# Patient Record
Sex: MALE | Race: WHITE
[De-identification: names, ages, dates, MRNs, and addresses within clinical notes are randomized per-mention and may not be internally consistent; named-entity substitution may affect disease eponyms.]

---

## 2023-07-19 ENCOUNTER — HOSPITAL ENCOUNTER
Dept: HOSPITAL 101 - LAB | Age: 83
Discharge: HOME | End: 2023-07-19
Payer: MEDICARE

## 2023-07-19 DIAGNOSIS — N18.31: Primary | ICD-10-CM

## 2023-07-19 DIAGNOSIS — E11.65: ICD-10-CM

## 2023-07-19 LAB
ANION GAP: 14.4
BLOOD UREA NITROGEN: 20 MG/DL (ref 7–18)
CALCIUM: 8.5 MG/DL (ref 8.5–10.1)
CARBON DIOXIDE: 26 MMOL/L (ref 21–32)
CHLORIDE: 100 MMOL/L (ref 98–107)
CO2 BLD-SCNC: 26 MMOL/L (ref 21–32)
ESTIMATED GFR (AFRICAN AMERICA: >60 (ref 60–?)
ESTIMATED GFR (NON-AFRICAN AME: 50 (ref 60–?)
GLUCOSE BLD-MCNC: 109 MG/DL (ref 74–106)
MAGNESIUM: 1.7 MG/DL (ref 1.8–2.4)
POTASSIUM SERPLBLD-SCNC: 4.4 MMOL/L (ref 3.5–5.1)
POTASSIUM: 4.4 MMOL/L (ref 3.5–5.1)
SODIUM BLD-SCNC: 136 MMOL/L (ref 136–145)
SODIUM: 136 MMOL/L (ref 136–145)

## 2023-07-19 PROCEDURE — 83735 ASSAY OF MAGNESIUM: CPT

## 2023-07-19 PROCEDURE — 36415 COLL VENOUS BLD VENIPUNCTURE: CPT

## 2023-07-19 PROCEDURE — 80048 BASIC METABOLIC PNL TOTAL CA: CPT

## 2023-07-19 PROCEDURE — 83036 HEMOGLOBIN GLYCOSYLATED A1C: CPT

## 2023-09-07 ENCOUNTER — HOSPITAL ENCOUNTER
Dept: HOSPITAL 101 - LAB | Age: 83
Discharge: HOME | End: 2023-09-07
Payer: MEDICARE

## 2023-09-07 DIAGNOSIS — R53.83: ICD-10-CM

## 2023-09-07 DIAGNOSIS — R55: ICD-10-CM

## 2023-09-07 DIAGNOSIS — E11.65: Primary | ICD-10-CM

## 2023-09-07 DIAGNOSIS — I10: ICD-10-CM

## 2023-09-07 LAB
ADD MANUAL DIFF: NO
ALANINE AMINOTRANSFERASE: 26 U/L (ref 16–63)
ALBUMIN GLOBULIN RATIO: 1.2
ALBUMIN LEVEL: 4.2 G/DL (ref 3.4–5)
ALKALINE PHOSPHATASE: 82 U/L (ref 46–116)
ANION GAP: 10.2
ASPARTATE AMINO TRANSFERASE: 16 U/L (ref 15–37)
BLOOD UREA NITROGEN: 21 MG/DL (ref 7–18)
CALCIUM: 9 MG/DL (ref 8.5–10.1)
CARBON DIOXIDE: 24.6 MMOL/L (ref 21–32)
CHLORIDE: 96 MMOL/L (ref 98–107)
CO2 BLD-SCNC: 24.6 MMOL/L (ref 21–32)
ESTIMATED GFR (AFRICAN AMERICA: 48 (ref 60–?)
ESTIMATED GFR (NON-AFRICAN AME: 39 (ref 60–?)
GLOBULIN: 3.4 G/DL
GLUCOSE BLD-MCNC: 100 MG/DL (ref 74–106)
HCT VFR BLD CALC: 42.9 % (ref 42–54)
HEMATOCRIT: 42.9 % (ref 42–54)
HEMOGLOBIN: 14.2 G/DL (ref 14–18)
IMMATURE GRANULOCYTES ABS AUTO: 0.03 10^3/UL (ref 0–0.03)
IMMATURE GRANULOCYTES PCT AUTO: 0.4 % (ref 0–0.5)
LYMPHOCYTES  ABSOLUTE AUTO: 1.2 10^3/UL (ref 1.2–3.8)
MCV RBC: 89.9 FL (ref 80–94)
MEAN CORPUSCULAR HEMOGLOBIN: 29.8 PG (ref 25.9–34)
MEAN CORPUSCULAR HGB CONC: 33.1 G/DL (ref 29.9–35.2)
MEAN CORPUSCULAR VOLUME: 89.9 FL (ref 80–94)
PLATELET # BLD: 187 10^3/UL (ref 150–450)
PLATELET COUNT: 187 10^3/UL (ref 150–450)
POTASSIUM SERPLBLD-SCNC: 3.8 MMOL/L (ref 3.5–5.1)
POTASSIUM: 3.8 MMOL/L (ref 3.5–5.1)
RED BLOOD COUNT: 4.77 10^6/UL (ref 4.7–6.1)
SODIUM BLD-SCNC: 127 MMOL/L (ref 136–145)
SODIUM: 127 MMOL/L (ref 136–145)
THYROID STIMULATING HORMONE: 3.02 UIU/ML (ref 0.36–3.74)
TOTAL PROTEIN: 7.6 G/DL (ref 6.4–8.2)
WBC # BLD: 7.9 10^3/UL (ref 4–11)
WHITE BLOOD COUNT: 7.9 10^3/UL (ref 4–11)

## 2023-09-07 PROCEDURE — 85025 COMPLETE CBC W/AUTO DIFF WBC: CPT

## 2023-09-07 PROCEDURE — 36415 COLL VENOUS BLD VENIPUNCTURE: CPT

## 2023-09-07 PROCEDURE — 80053 COMPREHEN METABOLIC PANEL: CPT

## 2023-09-07 PROCEDURE — 84443 ASSAY THYROID STIM HORMONE: CPT

## 2023-09-07 PROCEDURE — 83036 HEMOGLOBIN GLYCOSYLATED A1C: CPT

## 2023-09-18 ENCOUNTER — HOSPITAL ENCOUNTER
Dept: HOSPITAL 101 - LAB | Age: 83
Discharge: HOME | End: 2023-09-18
Payer: MEDICARE

## 2023-09-18 ENCOUNTER — HOSPITAL ENCOUNTER
Dept: HOSPITAL 101 - US | Age: 83
Discharge: HOME | End: 2023-09-18
Payer: MEDICARE

## 2023-09-18 DIAGNOSIS — E87.1: Primary | ICD-10-CM

## 2023-09-18 DIAGNOSIS — R22.41: Primary | ICD-10-CM

## 2023-09-18 LAB — THYROID STIMULATING HORMONE: 1.94 UIU/ML (ref 0.36–3.74)

## 2023-09-18 PROCEDURE — 76882 US LMTD JT/FCL EVL NVASC XTR: CPT

## 2023-09-18 PROCEDURE — 36415 COLL VENOUS BLD VENIPUNCTURE: CPT

## 2023-09-18 PROCEDURE — 83935 ASSAY OF URINE OSMOLALITY: CPT

## 2023-09-18 PROCEDURE — 83930 ASSAY OF BLOOD OSMOLALITY: CPT

## 2023-09-18 PROCEDURE — 84443 ASSAY THYROID STIM HORMONE: CPT

## 2023-09-18 PROCEDURE — 84300 ASSAY OF URINE SODIUM: CPT

## 2023-09-19 LAB — OSMOLALITY, URINE: 602 MOSMOL/KG

## 2023-10-12 DIAGNOSIS — I48.0 PAROXYSMAL ATRIAL FIBRILLATION (MULTI): ICD-10-CM

## 2023-10-13 PROBLEM — Z95.1 HISTORY OF CORONARY ARTERY BYPASS GRAFT: Status: ACTIVE | Noted: 2023-10-13

## 2023-10-13 PROBLEM — I10 ESSENTIAL HYPERTENSION: Status: ACTIVE | Noted: 2023-10-13

## 2023-10-13 PROBLEM — Z79.01 LONG TERM CURRENT USE OF ANTICOAGULANT THERAPY: Status: ACTIVE | Noted: 2023-10-13

## 2023-10-13 PROBLEM — E78.5 HYPERLIPIDEMIA: Status: ACTIVE | Noted: 2023-10-13

## 2023-10-13 PROBLEM — I20.9 ANGINA, CLASS I (CMS-HCC): Status: ACTIVE | Noted: 2023-10-13

## 2023-10-13 PROBLEM — I48.0 PAROXYSMAL ATRIAL FIBRILLATION (MULTI): Status: ACTIVE | Noted: 2023-10-13

## 2023-10-13 PROBLEM — G45.9 TIA (TRANSIENT ISCHEMIC ATTACK): Status: ACTIVE | Noted: 2023-10-13

## 2023-10-13 PROBLEM — R55 SYNCOPE AND COLLAPSE: Status: ACTIVE | Noted: 2023-10-13

## 2023-10-13 PROBLEM — D64.9 ANEMIA: Status: ACTIVE | Noted: 2023-10-13

## 2023-10-13 PROBLEM — C7A.8 NEUROENDOCRINE CANCER (MULTI): Status: ACTIVE | Noted: 2023-10-13

## 2023-10-13 PROBLEM — I25.10 ASCVD (ARTERIOSCLEROTIC CARDIOVASCULAR DISEASE): Status: ACTIVE | Noted: 2023-10-13

## 2023-10-13 PROBLEM — Z98.61 HISTORY OF PTCA: Status: ACTIVE | Noted: 2023-10-13

## 2023-10-13 RX ORDER — LOSARTAN POTASSIUM 50 MG/1
50 TABLET ORAL 2 TIMES DAILY
COMMUNITY
Start: 2023-10-06

## 2023-10-13 RX ORDER — ATORVASTATIN CALCIUM 40 MG/1
1 TABLET, FILM COATED ORAL NIGHTLY
COMMUNITY

## 2023-10-13 RX ORDER — HYDROCODONE BITARTRATE AND ACETAMINOPHEN 5; 325 MG/1; MG/1
1 TABLET ORAL EVERY 4 HOURS PRN
COMMUNITY
Start: 2023-07-05

## 2023-10-13 RX ORDER — AMIODARONE HYDROCHLORIDE 200 MG/1
200 TABLET ORAL DAILY
COMMUNITY
Start: 2023-09-19 | End: 2023-12-05 | Stop reason: SDUPTHER

## 2023-10-13 RX ORDER — TAMSULOSIN HYDROCHLORIDE 0.4 MG/1
1 CAPSULE ORAL DAILY
COMMUNITY

## 2023-10-13 RX ORDER — POTASSIUM CHLORIDE 20 MEQ/1
20 TABLET, EXTENDED RELEASE ORAL 2 TIMES DAILY
COMMUNITY
Start: 2023-09-19

## 2023-10-13 RX ORDER — ALPRAZOLAM 0.5 MG/1
0.5 TABLET ORAL EVERY 8 HOURS PRN
COMMUNITY

## 2023-10-13 RX ORDER — LINACLOTIDE 145 UG/1
145 CAPSULE, GELATIN COATED ORAL
COMMUNITY
Start: 2023-03-22

## 2023-10-13 RX ORDER — LANOLIN ALCOHOL/MO/W.PET/CERES
1 CREAM (GRAM) TOPICAL 2 TIMES DAILY
COMMUNITY
Start: 2023-05-31

## 2023-10-13 RX ORDER — APIXABAN 5 MG/1
5 TABLET, FILM COATED ORAL 2 TIMES DAILY
COMMUNITY
Start: 2023-10-12

## 2023-10-13 RX ORDER — MEMANTINE HYDROCHLORIDE 5 MG/1
5 TABLET ORAL DAILY
COMMUNITY
Start: 2023-10-09

## 2023-10-13 RX ORDER — PANTOPRAZOLE SODIUM 40 MG/1
1 TABLET, DELAYED RELEASE ORAL DAILY
COMMUNITY

## 2023-10-16 ENCOUNTER — TELEPHONE (OUTPATIENT)
Dept: CARDIOLOGY | Facility: CLINIC | Age: 83
End: 2023-10-16
Payer: MEDICARE

## 2023-10-16 DIAGNOSIS — I48.0 PAROXYSMAL ATRIAL FIBRILLATION (MULTI): ICD-10-CM

## 2023-10-16 RX ORDER — AMIODARONE HYDROCHLORIDE 200 MG/1
200 TABLET ORAL DAILY
Qty: 90 TABLET | Refills: 1 | Status: SHIPPED | OUTPATIENT
Start: 2023-10-16 | End: 2024-01-04

## 2023-10-16 NOTE — TELEPHONE ENCOUNTER
Daughter, Yadi called regarding amio medication. States patient ran out about 7 days ago and is not sure if he was taking correctly prior to that. States she has marked his medication for one tablet daily and will monitor and follow medication. Inquired if patient should be set up for chest, pft and lab work for amio at ?   Daughter also wanted to update office his b/p remains in the elevated range-179/?  To Dr. Clifford Fish, DO

## 2023-10-19 ENCOUNTER — HOSPITAL ENCOUNTER
Dept: HOSPITAL 101 - LAB | Age: 83
Discharge: HOME | End: 2023-10-19
Payer: MEDICARE

## 2023-10-19 DIAGNOSIS — I10: ICD-10-CM

## 2023-10-19 DIAGNOSIS — E11.65: Primary | ICD-10-CM

## 2023-10-19 DIAGNOSIS — E87.1: ICD-10-CM

## 2023-10-19 LAB
ANION GAP: 8.9
BLOOD UREA NITROGEN: 20 MG/DL (ref 7–18)
CALCIUM: 8.6 MG/DL (ref 8.5–10.1)
CARBON DIOXIDE: 28.6 MMOL/L (ref 21–32)
CHLORIDE: 97 MMOL/L (ref 98–107)
CHOLESTEROL: 156 MG/DL (ref ?–200)
CO2 BLD-SCNC: 28.6 MMOL/L (ref 21–32)
ESTIMATED GFR (AFRICAN AMERICA: >60 (ref 60–?)
ESTIMATED GFR (NON-AFRICAN AME: 51 (ref 60–?)
GLUCOSE BLD-MCNC: 81 MG/DL (ref 74–106)
HDL CHOLESTEROL: 75 MG/DL (ref 40–60)
POTASSIUM SERPLBLD-SCNC: 4.5 MMOL/L (ref 3.5–5.1)
POTASSIUM: 4.5 MMOL/L (ref 3.5–5.1)
SODIUM BLD-SCNC: 130 MMOL/L (ref 136–145)
SODIUM: 130 MMOL/L (ref 136–145)
TOTAL PROTEIN: 6.7 G/DL (ref 6.4–8.2)
TRIGLYCERIDES: 42 MG/DL (ref ?–150)
VLDL CHOLESTEROL: 8.4 MG/DL

## 2023-10-19 PROCEDURE — 80048 BASIC METABOLIC PNL TOTAL CA: CPT

## 2023-10-19 PROCEDURE — 83930 ASSAY OF BLOOD OSMOLALITY: CPT

## 2023-10-19 PROCEDURE — 80061 LIPID PANEL: CPT

## 2023-10-19 PROCEDURE — 36415 COLL VENOUS BLD VENIPUNCTURE: CPT

## 2023-10-19 PROCEDURE — 84155 ASSAY OF PROTEIN SERUM: CPT

## 2023-11-21 ENCOUNTER — HOSPITAL ENCOUNTER
Dept: HOSPITAL 101 - CARD | Age: 83
Discharge: HOME | End: 2023-11-21
Payer: MEDICARE

## 2023-11-21 DIAGNOSIS — I48.0: Primary | ICD-10-CM

## 2023-11-21 LAB
ANION GAP: 14.6
ASPARTATE AMINO TRANSFERASE: 22 U/L (ref 15–37)
BLOOD UREA NITROGEN: 17 MG/DL (ref 7–18)
CALCIUM: 8.5 MG/DL (ref 8.5–10.1)
CARBON DIOXIDE: 25.5 MMOL/L (ref 21–32)
CHLORIDE: 97 MMOL/L (ref 98–107)
CO2 BLD-SCNC: 25.5 MMOL/L (ref 21–32)
ESTIMATED GFR (AFRICAN AMERICA: 58 (ref 60–?)
ESTIMATED GFR (NON-AFRICAN AME: 48 (ref 60–?)
GLUCOSE BLD-MCNC: 81 MG/DL (ref 74–106)
HEMOGLOBIN: 12.8 G/DL (ref 14–18)
POTASSIUM SERPLBLD-SCNC: 4.1 MMOL/L (ref 3.5–5.1)
POTASSIUM: 4.1 MMOL/L (ref 3.5–5.1)
SODIUM BLD-SCNC: 133 MMOL/L (ref 136–145)
SODIUM: 133 MMOL/L (ref 136–145)
THYROID STIMULATING HORMONE: 4.25 UIU/ML (ref 0.36–3.74)

## 2023-11-21 PROCEDURE — 71046 X-RAY EXAM CHEST 2 VIEWS: CPT

## 2023-11-21 PROCEDURE — 94729 DIFFUSING CAPACITY: CPT

## 2023-11-21 PROCEDURE — 84450 TRANSFERASE (AST) (SGOT): CPT

## 2023-11-21 PROCEDURE — 85018 HEMOGLOBIN: CPT

## 2023-11-21 PROCEDURE — 94060 EVALUATION OF WHEEZING: CPT

## 2023-11-21 PROCEDURE — 84443 ASSAY THYROID STIM HORMONE: CPT

## 2023-11-21 PROCEDURE — 80048 BASIC METABOLIC PNL TOTAL CA: CPT

## 2023-11-21 PROCEDURE — 36415 COLL VENOUS BLD VENIPUNCTURE: CPT

## 2023-11-21 PROCEDURE — 94726 PLETHYSMOGRAPHY LUNG VOLUMES: CPT

## 2023-11-21 RX ADMIN — ALBUTEROL SULFATE 2.5 MG: 2.5 SOLUTION RESPIRATORY (INHALATION) at 14:14

## 2023-12-05 ENCOUNTER — OFFICE VISIT (OUTPATIENT)
Dept: CARDIOLOGY | Facility: CLINIC | Age: 83
End: 2023-12-05
Payer: MEDICARE

## 2023-12-05 VITALS
HEART RATE: 64 BPM | BODY MASS INDEX: 28.35 KG/M2 | WEIGHT: 198 LBS | HEIGHT: 70 IN | DIASTOLIC BLOOD PRESSURE: 80 MMHG | SYSTOLIC BLOOD PRESSURE: 152 MMHG

## 2023-12-05 DIAGNOSIS — E78.2 MIXED HYPERLIPIDEMIA: ICD-10-CM

## 2023-12-05 DIAGNOSIS — I48.0 PAROXYSMAL ATRIAL FIBRILLATION (MULTI): Primary | ICD-10-CM

## 2023-12-05 DIAGNOSIS — Z79.899 HIGH RISK MEDICATION USE: ICD-10-CM

## 2023-12-05 DIAGNOSIS — I25.10 ASCVD (ARTERIOSCLEROTIC CARDIOVASCULAR DISEASE): ICD-10-CM

## 2023-12-05 DIAGNOSIS — Z79.01 LONG TERM CURRENT USE OF ANTICOAGULANT THERAPY: ICD-10-CM

## 2023-12-05 DIAGNOSIS — I10 ESSENTIAL HYPERTENSION: ICD-10-CM

## 2023-12-05 PROCEDURE — 3079F DIAST BP 80-89 MM HG: CPT | Performed by: NURSE PRACTITIONER

## 2023-12-05 PROCEDURE — 93000 ELECTROCARDIOGRAM COMPLETE: CPT | Performed by: NURSE PRACTITIONER

## 2023-12-05 PROCEDURE — 1159F MED LIST DOCD IN RCRD: CPT | Performed by: NURSE PRACTITIONER

## 2023-12-05 PROCEDURE — 99214 OFFICE O/P EST MOD 30 MIN: CPT | Performed by: NURSE PRACTITIONER

## 2023-12-05 PROCEDURE — 3077F SYST BP >= 140 MM HG: CPT | Performed by: NURSE PRACTITIONER

## 2023-12-05 PROCEDURE — 1160F RVW MEDS BY RX/DR IN RCRD: CPT | Performed by: NURSE PRACTITIONER

## 2023-12-05 RX ORDER — SUCRALFATE 1 G/1
1 TABLET ORAL
COMMUNITY

## 2023-12-05 RX ORDER — AMLODIPINE BESYLATE 5 MG/1
5 TABLET ORAL DAILY
COMMUNITY

## 2023-12-06 ASSESSMENT — ENCOUNTER SYMPTOMS
DYSPNEA ON EXERTION: 0
PALPITATIONS: 0
ORTHOPNEA: 0
NEAR-SYNCOPE: 0
PND: 0
IRREGULAR HEARTBEAT: 0
SYNCOPE: 0

## 2023-12-06 NOTE — PROGRESS NOTES
"Chief Complaint  \"No concerns\"     Reason for Visit  3-month follow-up.  Patient presents to the office today for outpatient follow-up for coronary artery disease, secondary prevention, atrial fibrillation and high risk medication use.  Last evaluated in clinic by Dr. Fish September 2023.  At that time, amiodarone initiated.    Presents today ambulatory with steady gait.  Accompanied by child  Patient denies any hospitalizations or significant changes to interval medical history since last office follow-up.     History of Present Illness   Patient is an extremely pleasant 83-year-old gentleman who presents to the office today with no voiced complaints.  He remains active doing ADLs, sweeping his floor, no stairs at home.  Had no complaints ambulating in from the front door.  The daughter reports she has noted increased phlegm production and\" tickling cough\".  No nocturnal cough.  He denies shortness of breath.  He states his palpitations have\" been good\".    He reportedly had pulmonary surveillance testing completed a couple weeks ago, results are not available in our EMR.  I will obtain and review.  The daughter is concerned with continued amiodarone dose and\" PFT showing he has COPD\".  We had lengthy discussion as she is questioning if he could come off amiodarone and have a Watchman device.  Discussed that Watchman device is to reduce cardioembolic events and he is tolerating anticoagulation with Eliquis.  Other treatment for atrial fibrillation could be Multaq, due to advanced age ablation would not be recommended and patient is adamantly against.      Patient reports that overall has no complaint(s) of chest pain, chest pressure/discomfort, exertional chest pressure/discomfort, fatigue, lower extremity edema, orthopnea, and paroxysmal nocturnal dyspnea    Daily activity:   ADLS, light housework  Denies any change in exercise capacity or functional tolerance since last office visit.    Review of Systems " "  Cardiovascular:  Negative for chest pain, dyspnea on exertion, irregular heartbeat, leg swelling, near-syncope, orthopnea, palpitations, paroxysmal nocturnal dyspnea and syncope.        Visit Vitals  /80 (BP Location: Left arm, Patient Position: Sitting)   Pulse 64   Ht 1.778 m (5' 10\")   Wt 89.8 kg (198 lb)   BMI 28.41 kg/m²   Smoking Status Former   BSA 2.11 m²     Physical Exam  Vitals and nursing note reviewed.   Constitutional:       Appearance: Normal appearance.   Cardiovascular:      Rate and Rhythm: Normal rate and regular rhythm.      Heart sounds: Normal heart sounds.   Pulmonary:      Effort: Pulmonary effort is normal.      Breath sounds: Normal breath sounds.   Musculoskeletal:      Cervical back: Full passive range of motion without pain.      Right lower leg: No edema.      Left lower leg: No edema.   Skin:     General: Skin is cool.   Neurological:      Mental Status: He is alert and oriented to person, place, and time.   Psychiatric:         Attention and Perception: Attention normal.         Mood and Affect: Mood normal.         Behavior: Behavior is cooperative.        Allergies   Allergen Reactions    Dilaudid [Hydromorphone] Unknown    Iodinated Contrast Media Unknown    Iodine Unknown    Sulfa (Sulfonamide Antibiotics) Unknown     Current Outpatient Medications   Medication Instructions    ALPRAZolam (XANAX) 0.5 mg, oral, Every 8 hours PRN    amiodarone (PACERONE) 200 mg, oral, Daily    amLODIPine (NORVASC) 5 mg, oral, Daily    atorvastatin (Lipitor) 40 mg tablet 1 tablet, oral, Nightly    Eliquis 5 mg, oral, 2 times daily    HYDROcodone-acetaminophen (Norco) 5-325 mg tablet 1 tablet, oral, Every 4 hours PRN    Linzess 145 mcg, oral, Daily before breakfast    losartan (COZAAR) 50 mg, oral, 2 times daily    magnesium oxide (Mag-Ox) 400 mg (241.3 mg magnesium) tablet 1 tablet, oral, 2 times daily    memantine (NAMENDA) 5 mg, oral, Daily    pantoprazole (ProtoNix) 40 mg EC tablet 1 " tablet, oral, Daily    potassium chloride CR 20 mEq ER tablet 20 mEq, oral, 2 times daily    sucralfate (CARAFATE) 1 g, oral, 4 times daily before meals and nightly, PRN    tamsulosin (Flomax) 0.4 mg 24 hr capsule 1 capsule, oral, Daily     Assessment:    High risk medication use  Amiodarone start date September 2023  Reports surveillance testing October 2023 at [facility]  QTc in office 455    ASCVD (arteriosclerotic cardiovascular disease)  Remote coronary bypass grafting    NSTEMI  - 2019 cardiac cath:  OM PCI/NIYAH 2.25/15mm  OM2  unable to cross  LIMA- LAD patent  SVG- RCA occluded  Native RCA patent stent  SVG - OM2 thrombotic occlusion  SVG to diagonal with diffuse 70% distal one third    March 2023 MPI no ischemia    Current daily activity 4 METS without concerning symptoms    Essential hypertension  Optimal in office    Hyperlipidemia  Moderate intensity statin    Paroxysmal atrial fibrillation (CMS/HCC)  Maintaining normal sinus rhythm on amiodarone  Last A-fib documented June 2023 Holter - amio start date Sept 2023  Denies palpitations.    Long term current use of anticoagulant therapy  CHADS VASc 6 chronically anticoagulated full dose Eliquis with age 83, weight 198, last known creatinine 1.48 with prior creatinine 1.1.  Will obtain recent labs for amiodarone surveillance and verify correct dosage.    Currently denies bleeding diatheses    BMI 28.0-28.9,adult  Reviewed the merits of healthy lifestyle choices on overall cardiovascular health.      Cardiac and Vasculature  February 2023 TTE  LVEF 55 to 60%  LVH mild  LAE moderate  MR mild  RVSP 25 mmHg    Plan:     Through informed decision making process incorporating patients unique circumstances, the following treatment plan will be initiated:    1.  Prescription drug management of cardiovascular medication for efficacy, adherence to treatment, side effect assessment and polypharmacy. Current treatment clinically warranted and to continue without  modifications.    2. Return for follow-up; in the interim, contact the office if new symptoms arise.  Dr. Fish as scheduled    Elsa Palacios  MSN, APRN-CNP, PMHNP-BC  [facility]    Please excuse any errors in grammar or translation related to this dictation. Voice recognition software was utilized to prepare this document.

## 2023-12-06 NOTE — ASSESSMENT & PLAN NOTE
Amiodarone start date September 2023  Reports surveillance testing October 2023 at [facility]  QTc in office [id number]

## 2023-12-06 NOTE — ASSESSMENT & PLAN NOTE
CHADS VASc 6 chronically anticoagulated full dose Eliquis with age 83, weight 198, last known creatinine 1.48 with prior creatinine 1.1.  Will obtain recent labs for amiodarone surveillance and verify correct dosage.    Currently denies bleeding diatheses

## 2023-12-06 NOTE — ASSESSMENT & PLAN NOTE
Remote coronary bypass grafting    NSTEMI  - 2019 cardiac cath:  OM PCI/NIYAH 2.25/15mm  OM2  unable to cross  LIMA- LAD patent  SVG- RCA occluded  Native RCA patent stent  SVG - OM2 thrombotic occlusion  SVG to diagonal with diffuse 70% distal one third    March 2023 MPI no ischemia    Current daily activity 4 METS without concerning symptoms

## 2023-12-14 ENCOUNTER — TELEPHONE (OUTPATIENT)
Dept: CARDIOLOGY | Facility: CLINIC | Age: 83
End: 2023-12-14
Payer: MEDICARE

## 2023-12-14 DIAGNOSIS — Z79.899 HIGH RISK MEDICATION USE: ICD-10-CM

## 2023-12-14 DIAGNOSIS — I48.0 PAROXYSMAL ATRIAL FIBRILLATION (MULTI): ICD-10-CM

## 2023-12-14 NOTE — TELEPHONE ENCOUNTER
Patient daughter called requesting results that were to be called to her this week. She is asking for a return call from Elsa Edwards NP to discuss. [phone].    To Elsa Edwards NP

## 2023-12-19 NOTE — TELEPHONE ENCOUNTER
Attempted to phone patient dtr please note phone number in note is incorrect please call number in demographics. Left detailed message for dtr to return call.   Transferred to [facility] for follow up.

## 2023-12-22 NOTE — TELEPHONE ENCOUNTER
PFT for amio testing scheduling notified of sooner PFT. Thyroid testing ordered and placed in mail to patient. Along with letter.

## 2024-01-03 DIAGNOSIS — I48.0 PAROXYSMAL ATRIAL FIBRILLATION (MULTI): Primary | ICD-10-CM

## 2024-01-03 NOTE — TELEPHONE ENCOUNTER
Patients daughter phoned stating during office visit patient being switched to multaq was discussed and they are asking due to difficulty of pft testing and unable to finish test and copd showing on testing asking to switch they state insurance will cover. Also asking if eliquis should be reduced since discussed at last office visit.   Please call daughter at [phone] (nursing)    To Elsa Edwards NP for review.

## 2024-01-04 NOTE — TELEPHONE ENCOUNTER
Spoke with daughter gave orders verbalized understanding.  Patient would like EKG done at [facility].   Medication request sent to [facility] as requested.  Once order signed for EKG, fax to [facility].

## 2024-01-24 ENCOUNTER — HOSPITAL ENCOUNTER
Dept: HOSPITAL 101 - CARD | Age: 84
Discharge: HOME | End: 2024-01-24
Payer: MEDICARE

## 2024-01-24 DIAGNOSIS — I48.0: Primary | ICD-10-CM

## 2024-01-24 PROCEDURE — 93005 ELECTROCARDIOGRAM TRACING: CPT

## 2024-02-07 ENCOUNTER — TELEPHONE (OUTPATIENT)
Dept: CARDIOLOGY | Facility: CLINIC | Age: 84
End: 2024-02-07
Payer: MEDICARE

## 2024-02-07 NOTE — TELEPHONE ENCOUNTER
Daughter called to into office inquiring about results of EKG that is in the media tab that was completed at [facility]. States that patient complaining of fatigue and a burning tongue. Inquiring if could be related to Multaq    To Elsa Edwards NP for review.

## 2024-02-08 NOTE — TELEPHONE ENCOUNTER
Attempted to phone daughter from original message. No answer. Unable to leave message. To [facility]  to try again

## 2024-02-09 NOTE — TELEPHONE ENCOUNTER
Phoned daughter & was unable to leave a message. Phoned son and discussed the above. States he does have something going on with his tongue but is following the dentist for it. Son stated if anything changes, he would notify us.

## 2024-03-26 ENCOUNTER — APPOINTMENT (OUTPATIENT)
Dept: CARDIOLOGY | Facility: CLINIC | Age: 84
End: 2024-03-26
Payer: MEDICARE

## 2024-03-28 ENCOUNTER — HOSPITAL ENCOUNTER
Dept: HOSPITAL 101 - CARD | Age: 84
Discharge: HOME | End: 2024-03-28
Payer: MEDICARE

## 2024-03-28 DIAGNOSIS — R55: Primary | ICD-10-CM

## 2024-03-28 PROCEDURE — 93306 TTE W/DOPPLER COMPLETE: CPT

## 2024-05-13 ENCOUNTER — HOSPITAL ENCOUNTER
Dept: HOSPITAL 101 - LAB | Age: 84
Discharge: HOME | End: 2024-05-13
Payer: MEDICARE

## 2024-05-13 DIAGNOSIS — M25.561: ICD-10-CM

## 2024-05-13 DIAGNOSIS — I12.9: ICD-10-CM

## 2024-05-13 DIAGNOSIS — E11.65: ICD-10-CM

## 2024-05-13 DIAGNOSIS — I25.10: Primary | ICD-10-CM

## 2024-05-13 DIAGNOSIS — N18.9: ICD-10-CM

## 2024-05-13 LAB
ADD MANUAL DIFF: NO
ALANINE AMINOTRANSFERASE: 20 U/L (ref 16–63)
ALBUMIN GLOBULIN RATIO: 1.3
ALBUMIN LEVEL: 4 G/DL (ref 3.4–5)
ALKALINE PHOSPHATASE: 79 U/L (ref 46–116)
ANION GAP: 11.3
ASPARTATE AMINO TRANSFERASE: 16 U/L (ref 15–37)
BLOOD UREA NITROGEN: 25 MG/DL (ref 7–18)
CALCIUM: 8.9 MG/DL (ref 8.5–10.1)
CARBON DIOXIDE: 26.2 MMOL/L (ref 21–32)
CHLORIDE: 100 MMOL/L (ref 98–107)
CHOLESTEROL: 145 MG/DL (ref ?–200)
ESTIMATED GFR (AFRICAN AMERICA: 59 (ref 60–?)
ESTIMATED GFR (NON-AFRICAN AME: 49 (ref 60–?)
GLOBULIN: 3.1 G/DL
GLUCOSE: 78 MG/DL (ref 74–106)
HDL CHOLESTEROL: 62 MG/DL (ref 40–60)
HEMATOCRIT: 33.6 % (ref 42–54)
HEMOGLOBIN: 10.3 G/DL (ref 14–18)
IMMATURE GRANULOCYTES ABS AUTO: 0.02 10^3/UL (ref 0–0.03)
IMMATURE GRANULOCYTES PCT AUTO: 0.3 % (ref 0–0.5)
LYMPHOCYTES  ABSOLUTE AUTO: 1.3 10^3/UL (ref 1.2–3.8)
MCV RBC: 79.6 FL (ref 80–94)
MEAN CORPUSCULAR HEMOGLOBIN: 24.4 PG (ref 25.9–34)
MEAN CORPUSCULAR HGB CONC: 30.7 G/DL (ref 29.9–35.2)
PLATELET COUNT: 208 10^3/UL (ref 150–450)
POTASSIUM: 3.5 MMOL/L (ref 3.5–5.1)
RED BLOOD COUNT: 4.22 10^6/UL (ref 4.7–6.1)
SODIUM: 134 MMOL/L (ref 136–145)
TOTAL PROTEIN: 7.1 G/DL (ref 6.4–8.2)
TRIGLYCERIDES: 64 MG/DL (ref ?–150)
VLDL CHOLESTEROL: 12.8 MG/DL
WHITE BLOOD COUNT: 7.1 10^3/UL (ref 4–11)

## 2024-05-13 PROCEDURE — 83036 HEMOGLOBIN GLYCOSYLATED A1C: CPT

## 2024-05-13 PROCEDURE — 73564 X-RAY EXAM KNEE 4 OR MORE: CPT

## 2024-05-13 PROCEDURE — 85025 COMPLETE CBC W/AUTO DIFF WBC: CPT

## 2024-05-13 PROCEDURE — 36415 COLL VENOUS BLD VENIPUNCTURE: CPT

## 2024-05-13 PROCEDURE — 80053 COMPREHEN METABOLIC PANEL: CPT

## 2024-05-13 PROCEDURE — 82043 UR ALBUMIN QUANTITATIVE: CPT

## 2024-05-13 PROCEDURE — 80061 LIPID PANEL: CPT

## 2024-05-21 ENCOUNTER — HOSPITAL ENCOUNTER
Dept: HOSPITAL 101 - LAB | Age: 84
Discharge: HOME | End: 2024-05-21
Payer: MEDICARE

## 2024-05-21 DIAGNOSIS — D64.9: Primary | ICD-10-CM

## 2024-05-21 PROCEDURE — 82746 ASSAY OF FOLIC ACID SERUM: CPT

## 2024-05-21 PROCEDURE — 82728 ASSAY OF FERRITIN: CPT

## 2024-05-21 PROCEDURE — 83540 ASSAY OF IRON: CPT

## 2024-05-21 PROCEDURE — 82607 VITAMIN B-12: CPT

## 2024-05-21 PROCEDURE — 83550 IRON BINDING TEST: CPT

## 2024-05-23 ENCOUNTER — HOSPITAL ENCOUNTER
Dept: HOSPITAL 101 - LAB | Age: 84
Discharge: HOME | End: 2024-05-23
Payer: MEDICARE

## 2024-05-23 DIAGNOSIS — D64.9: Primary | ICD-10-CM

## 2024-05-23 LAB
ADD MANUAL DIFF: NO
FERRITIN: 9 NG/ML (ref 26–388)
FOLATE: 23.2 NG/ML (ref 8.6–58.9)
HEMATOCRIT: 32.5 % (ref 42–54)
HEMOGLOBIN: 10 G/DL (ref 14–18)
IMMATURE GRANULOCYTES ABS AUTO: 0.03 10^3/UL (ref 0–0.03)
IMMATURE GRANULOCYTES PCT AUTO: 0.5 % (ref 0–0.5)
IRON: 18 UG/DL (ref 65–175)
LYMPHOCYTES  ABSOLUTE AUTO: 1.1 10^3/UL (ref 1.2–3.8)
MCV RBC: 79.7 FL (ref 80–94)
MEAN CORPUSCULAR HEMOGLOBIN: 24.5 PG (ref 25.9–34)
MEAN CORPUSCULAR HGB CONC: 30.8 G/DL (ref 29.9–35.2)
PERCENT IRON SATURATION: 4.8 %
PLATELET COUNT: 202 10^3/UL (ref 150–450)
RED BLOOD COUNT: 4.08 10^6/UL (ref 4.7–6.1)
TOTAL IRON BINDING CAPACITY: 374 UG/DL (ref 250–450)
VITAMIN B12: 581 PG/ML (ref 193–986)
WHITE BLOOD COUNT: 5.6 10^3/UL (ref 4–11)

## 2024-05-23 PROCEDURE — 82746 ASSAY OF FOLIC ACID SERUM: CPT

## 2024-05-23 PROCEDURE — 82607 VITAMIN B-12: CPT

## 2024-05-23 PROCEDURE — 82728 ASSAY OF FERRITIN: CPT

## 2024-05-23 PROCEDURE — 85025 COMPLETE CBC W/AUTO DIFF WBC: CPT

## 2024-05-23 PROCEDURE — 83540 ASSAY OF IRON: CPT

## 2024-05-23 PROCEDURE — 83550 IRON BINDING TEST: CPT

## 2024-05-23 PROCEDURE — 36415 COLL VENOUS BLD VENIPUNCTURE: CPT

## 2024-08-22 ENCOUNTER — HOSPITAL ENCOUNTER
Age: 84
Discharge: HOME | End: 2024-08-22
Payer: MEDICARE

## 2024-08-22 DIAGNOSIS — N18.31: Primary | ICD-10-CM

## 2024-08-22 LAB
ANION GAP: 12.7
BLOOD UREA NITROGEN: 16 MG/DL (ref 7–18)
CALCIUM: 8.8 MG/DL (ref 8.5–10.1)
CARBON DIOXIDE: 26.9 MMOL/L (ref 21–32)
CHLORIDE: 98 MMOL/L (ref 98–107)
CO2 BLD-SCNC: 26.9 MMOL/L (ref 21–32)
ESTIMATED GFR (AFRICAN AMERICA: 54 (ref 60–?)
ESTIMATED GFR (NON-AFRICAN AME: 45 (ref 60–?)
GLUCOSE BLD-MCNC: 102 MG/DL (ref 74–106)
MAGNESIUM: 1.5 MG/DL (ref 1.8–2.4)
POTASSIUM SERPLBLD-SCNC: 4.6 MMOL/L (ref 3.5–5.1)
POTASSIUM: 4.6 MMOL/L (ref 3.5–5.1)
SODIUM BLD-SCNC: 133 MMOL/L (ref 136–145)
SODIUM: 133 MMOL/L (ref 136–145)

## 2024-08-22 PROCEDURE — 83735 ASSAY OF MAGNESIUM: CPT

## 2024-08-22 PROCEDURE — 80048 BASIC METABOLIC PNL TOTAL CA: CPT

## 2024-08-22 PROCEDURE — 36415 COLL VENOUS BLD VENIPUNCTURE: CPT

## 2024-09-12 ENCOUNTER — HOSPITAL ENCOUNTER
Dept: HOSPITAL 101 - NM | Age: 84
Discharge: HOME | End: 2024-09-12
Payer: MEDICARE

## 2024-09-12 DIAGNOSIS — I25.10: Primary | ICD-10-CM

## 2024-09-12 DIAGNOSIS — Z95.5: ICD-10-CM

## 2024-09-12 DIAGNOSIS — Z95.1: ICD-10-CM

## 2024-09-12 DIAGNOSIS — R55: ICD-10-CM

## 2024-09-12 PROCEDURE — A9500 TC99M SESTAMIBI: HCPCS

## 2024-09-12 PROCEDURE — 78452 HT MUSCLE IMAGE SPECT MULT: CPT

## 2024-09-12 PROCEDURE — 93017 CV STRESS TEST TRACING ONLY: CPT

## 2024-09-12 RX ADMIN — REGADENOSON 0.4 MG: 0.08 INJECTION, SOLUTION INTRAVENOUS at 12:33

## 2024-09-17 ENCOUNTER — HOSPITAL ENCOUNTER
Dept: HOSPITAL 101 - LAB | Age: 84
Discharge: HOME | End: 2024-09-17
Payer: MEDICARE

## 2024-09-17 DIAGNOSIS — R55: Primary | ICD-10-CM

## 2024-09-17 DIAGNOSIS — R94.39: ICD-10-CM

## 2024-09-17 LAB
ADD MANUAL DIFF: NO
ANION GAP: 8.8
BLOOD UREA NITROGEN: 26 MG/DL (ref 7–18)
CALCIUM: 8.6 MG/DL (ref 8.5–10.1)
CARBON DIOXIDE: 28.1 MMOL/L (ref 21–32)
CHLORIDE: 99 MMOL/L (ref 98–107)
CO2 BLD-SCNC: 28.1 MMOL/L (ref 21–32)
ESTIMATED GFR (AFRICAN AMERICA: 59 (ref 60–?)
ESTIMATED GFR (NON-AFRICAN AME: 48 (ref 60–?)
GLUCOSE BLD-MCNC: 84 MG/DL (ref 74–106)
HCT VFR BLD CALC: 40.1 % (ref 42–54)
HEMATOCRIT: 40.1 % (ref 42–54)
HEMOGLOBIN: 13.1 G/DL (ref 14–18)
IMMATURE GRANULOCYTES ABS AUTO: 0.03 10^3/UL (ref 0–0.03)
IMMATURE GRANULOCYTES PCT AUTO: 0.4 % (ref 0–0.5)
LYMPHOCYTES  ABSOLUTE AUTO: 1.5 10^3/UL (ref 1.2–3.8)
MCV RBC: 84.6 FL (ref 80–94)
MEAN CORPUSCULAR HEMOGLOBIN: 27.6 PG (ref 25.9–34)
MEAN CORPUSCULAR HGB CONC: 32.7 G/DL (ref 29.9–35.2)
MEAN CORPUSCULAR VOLUME: 84.6 FL (ref 80–94)
PLATELET # BLD: 155 10^3/UL (ref 150–450)
PLATELET COUNT: 155 10^3/UL (ref 150–450)
POTASSIUM SERPLBLD-SCNC: 3.9 MMOL/L (ref 3.5–5.1)
POTASSIUM: 3.9 MMOL/L (ref 3.5–5.1)
RED BLOOD COUNT: 4.74 10^6/UL (ref 4.7–6.1)
SODIUM BLD-SCNC: 132 MMOL/L (ref 136–145)
SODIUM: 132 MMOL/L (ref 136–145)
WBC # BLD: 7.6 10^3/UL (ref 4–11)
WHITE BLOOD COUNT: 7.6 10^3/UL (ref 4–11)

## 2024-09-17 PROCEDURE — 36415 COLL VENOUS BLD VENIPUNCTURE: CPT

## 2024-09-17 PROCEDURE — 80048 BASIC METABOLIC PNL TOTAL CA: CPT

## 2024-09-17 PROCEDURE — 85025 COMPLETE CBC W/AUTO DIFF WBC: CPT

## 2024-11-26 ENCOUNTER — HOSPITAL ENCOUNTER (OUTPATIENT)
Dept: HOSPITAL 101 - ER | Age: 84
Setting detail: OBSERVATION
LOS: 1 days | Discharge: HOME HEALTH | End: 2024-11-27
Payer: MEDICARE

## 2024-11-26 VITALS — HEART RATE: 87 BPM | OXYGEN SATURATION: 100 %

## 2024-11-26 VITALS — SYSTOLIC BLOOD PRESSURE: 169 MMHG | DIASTOLIC BLOOD PRESSURE: 87 MMHG | HEART RATE: 81 BPM

## 2024-11-26 VITALS — HEART RATE: 77 BPM | DIASTOLIC BLOOD PRESSURE: 103 MMHG | SYSTOLIC BLOOD PRESSURE: 169 MMHG | OXYGEN SATURATION: 100 %

## 2024-11-26 VITALS — OXYGEN SATURATION: 99 % | HEART RATE: 78 BPM | SYSTOLIC BLOOD PRESSURE: 139 MMHG | DIASTOLIC BLOOD PRESSURE: 89 MMHG

## 2024-11-26 VITALS
SYSTOLIC BLOOD PRESSURE: 148 MMHG | OXYGEN SATURATION: 97 % | TEMPERATURE: 97.88 F | HEART RATE: 76 BPM | DIASTOLIC BLOOD PRESSURE: 69 MMHG

## 2024-11-26 VITALS — HEART RATE: 72 BPM

## 2024-11-26 VITALS — BODY MASS INDEX: 27 KG/M2 | BODY MASS INDEX: 30.4 KG/M2

## 2024-11-26 VITALS — DIASTOLIC BLOOD PRESSURE: 111 MMHG | HEART RATE: 76 BPM | SYSTOLIC BLOOD PRESSURE: 181 MMHG

## 2024-11-26 VITALS
HEART RATE: 87 BPM | OXYGEN SATURATION: 98 % | TEMPERATURE: 98 F | DIASTOLIC BLOOD PRESSURE: 88 MMHG | SYSTOLIC BLOOD PRESSURE: 174 MMHG

## 2024-11-26 VITALS
TEMPERATURE: 97.8 F | OXYGEN SATURATION: 97 % | HEART RATE: 69 BPM | SYSTOLIC BLOOD PRESSURE: 168 MMHG | DIASTOLIC BLOOD PRESSURE: 90 MMHG

## 2024-11-26 VITALS — OXYGEN SATURATION: 100 % | HEART RATE: 84 BPM

## 2024-11-26 VITALS — HEART RATE: 82 BPM | DIASTOLIC BLOOD PRESSURE: 88 MMHG | SYSTOLIC BLOOD PRESSURE: 157 MMHG | OXYGEN SATURATION: 100 %

## 2024-11-26 VITALS — HEART RATE: 83 BPM | OXYGEN SATURATION: 100 % | DIASTOLIC BLOOD PRESSURE: 80 MMHG | SYSTOLIC BLOOD PRESSURE: 113 MMHG

## 2024-11-26 VITALS — OXYGEN SATURATION: 92 %

## 2024-11-26 VITALS
SYSTOLIC BLOOD PRESSURE: 181 MMHG | DIASTOLIC BLOOD PRESSURE: 111 MMHG | HEART RATE: 77 BPM | TEMPERATURE: 97.5 F | OXYGEN SATURATION: 98 %

## 2024-11-26 VITALS — HEART RATE: 90 BPM | SYSTOLIC BLOOD PRESSURE: 150 MMHG | DIASTOLIC BLOOD PRESSURE: 87 MMHG | OXYGEN SATURATION: 99 %

## 2024-11-26 VITALS — HEART RATE: 84 BPM

## 2024-11-26 VITALS — HEART RATE: 82 BPM

## 2024-11-26 VITALS — HEART RATE: 80 BPM | OXYGEN SATURATION: 99 %

## 2024-11-26 VITALS — HEART RATE: 96 BPM

## 2024-11-26 VITALS — OXYGEN SATURATION: 98 %

## 2024-11-26 VITALS — HEART RATE: 81 BPM | OXYGEN SATURATION: 100 %

## 2024-11-26 VITALS — HEART RATE: 77 BPM

## 2024-11-26 VITALS — HEART RATE: 78 BPM

## 2024-11-26 DIAGNOSIS — Z20.822: ICD-10-CM

## 2024-11-26 DIAGNOSIS — I11.0: ICD-10-CM

## 2024-11-26 DIAGNOSIS — E78.5: ICD-10-CM

## 2024-11-26 DIAGNOSIS — R11.0: ICD-10-CM

## 2024-11-26 DIAGNOSIS — I48.19: ICD-10-CM

## 2024-11-26 DIAGNOSIS — E87.6: ICD-10-CM

## 2024-11-26 DIAGNOSIS — Z23: ICD-10-CM

## 2024-11-26 DIAGNOSIS — Z87.891: ICD-10-CM

## 2024-11-26 DIAGNOSIS — R63.0: ICD-10-CM

## 2024-11-26 DIAGNOSIS — Z95.1: ICD-10-CM

## 2024-11-26 DIAGNOSIS — I25.10: ICD-10-CM

## 2024-11-26 DIAGNOSIS — I95.1: Primary | ICD-10-CM

## 2024-11-26 DIAGNOSIS — Z79.899: ICD-10-CM

## 2024-11-26 DIAGNOSIS — I50.32: ICD-10-CM

## 2024-11-26 DIAGNOSIS — R53.1: ICD-10-CM

## 2024-11-26 LAB
ADD MANUAL DIFF: NO
ALANINE AMINOTRANSFERASE: 23 U/L (ref 16–63)
ALBUMIN GLOBULIN RATIO: 1.3
ALBUMIN LEVEL: 3.6 G/DL (ref 3.4–5)
ALKALINE PHOSPHATASE: 90 U/L (ref 46–116)
ANION GAP: 15.5
ASPARTATE AMINO TRANSFERASE: 17 U/L (ref 15–37)
BLOOD UREA NITROGEN: 9 MG/DL (ref 7–18)
CALCIUM: 8.9 MG/DL (ref 8.5–10.1)
CARBON DIOXIDE: 23.5 MMOL/L (ref 21–32)
CAST SEEN?: (no result) #/LPF
CHLORIDE: 96 MMOL/L (ref 98–107)
CO2 BLD-SCNC: 23.5 MMOL/L (ref 21–32)
CREATINE KINASE: 75 U/L (ref 39–308)
ESTIMATED GFR (AFRICAN AMERICA: 57
ESTIMATED GFR (NON-AFRICAN AME: 47
GLOBULIN: 2.7 G/DL
GLUCOSE BLD-MCNC: 106 MG/DL (ref 74–106)
GLUCOSE URINE UA: NEGATIVE MG/DL
HCT VFR BLD CALC: 42.3 % (ref 42–54)
HEMATOCRIT: 42.3 % (ref 42–54)
HEMOGLOBIN: 14.1 G/DL (ref 14–18)
IMMATURE GRANULOCYTES ABS AUTO: 0.01 10^3/UL (ref 0–0.03)
IMMATURE GRANULOCYTES PCT AUTO: 0.2 % (ref 0–0.5)
LIPASE: 29 U/L (ref 16–77)
LYMPHOCYTES  ABSOLUTE AUTO: 1.3 10^3/UL (ref 1.2–3.8)
MCV RBC: 85.5 FL (ref 80–94)
MEAN CORPUSCULAR HEMOGLOBIN: 28.5 PG (ref 25.9–34)
MEAN CORPUSCULAR HGB CONC: 33.3 G/DL (ref 29.9–35.2)
MEAN CORPUSCULAR VOLUME: 85.5 FL (ref 80–94)
PLATELET # BLD: 196 10^3/UL (ref 150–450)
PLATELET COUNT: 196 10^3/UL (ref 150–450)
POTASSIUM SERPLBLD-SCNC: 3 MMOL/L (ref 3.5–5.1)
POTASSIUM: 3 MMOL/L (ref 3.5–5.1)
RED BLOOD COUNT: 4.95 10^6/UL (ref 4.7–6.1)
SARS-COV-2 AG: NEGATIVE
SODIUM BLD-SCNC: 132 MMOL/L (ref 136–145)
SODIUM: 132 MMOL/L (ref 136–145)
TOTAL PROTEIN: 6.3 G/DL (ref 6.4–8.2)
URINE CULTURE INDICATED: NO
WBC # BLD: 6 10^3/UL (ref 4–11)
WHITE BLOOD COUNT: 6 10^3/UL (ref 4–11)

## 2024-11-26 PROCEDURE — 90662 IIV NO PRSV INCREASED AG IM: CPT

## 2024-11-26 PROCEDURE — 82550 ASSAY OF CK (CPK): CPT

## 2024-11-26 PROCEDURE — 80053 COMPREHEN METABOLIC PANEL: CPT

## 2024-11-26 PROCEDURE — 83690 ASSAY OF LIPASE: CPT

## 2024-11-26 PROCEDURE — 97530 THERAPEUTIC ACTIVITIES: CPT

## 2024-11-26 PROCEDURE — 51798 US URINE CAPACITY MEASURE: CPT

## 2024-11-26 PROCEDURE — 96374 THER/PROPH/DIAG INJ IV PUSH: CPT

## 2024-11-26 PROCEDURE — 96376 TX/PRO/DX INJ SAME DRUG ADON: CPT

## 2024-11-26 PROCEDURE — 99285 EMERGENCY DEPT VISIT HI MDM: CPT

## 2024-11-26 PROCEDURE — 97161 PT EVAL LOW COMPLEX 20 MIN: CPT

## 2024-11-26 PROCEDURE — 93005 ELECTROCARDIOGRAM TRACING: CPT

## 2024-11-26 PROCEDURE — 96361 HYDRATE IV INFUSION ADD-ON: CPT

## 2024-11-26 PROCEDURE — 94761 N-INVAS EAR/PLS OXIMETRY MLT: CPT

## 2024-11-26 PROCEDURE — 96375 TX/PRO/DX INJ NEW DRUG ADDON: CPT

## 2024-11-26 PROCEDURE — 93306 TTE W/DOPPLER COMPLETE: CPT

## 2024-11-26 PROCEDURE — G0008 ADMIN INFLUENZA VIRUS VAC: HCPCS

## 2024-11-26 PROCEDURE — 87811 SARS-COV-2 COVID19 W/OPTIC: CPT

## 2024-11-26 PROCEDURE — 84484 ASSAY OF TROPONIN QUANT: CPT

## 2024-11-26 PROCEDURE — 81001 URINALYSIS AUTO W/SCOPE: CPT

## 2024-11-26 PROCEDURE — 85025 COMPLETE CBC W/AUTO DIFF WBC: CPT

## 2024-11-26 PROCEDURE — 74022 RADEX COMPL AQT ABD SERIES: CPT

## 2024-11-26 PROCEDURE — 36415 COLL VENOUS BLD VENIPUNCTURE: CPT

## 2024-11-26 PROCEDURE — G0378 HOSPITAL OBSERVATION PER HR: HCPCS

## 2024-11-26 RX ADMIN — ALPRAZOLAM 0.5 MG: 0.5 TABLET ORAL at 20:32

## 2024-11-26 RX ADMIN — SODIUM CHLORIDE 100 ML: 900 INJECTION, SOLUTION INTRAVENOUS at 11:29

## 2024-11-26 RX ADMIN — HYDROXYZINE PAMOATE 25 MG: 25 CAPSULE ORAL at 08:23

## 2024-11-26 RX ADMIN — POTASSIUM CHLORIDE 20 MEQ: 750 TABLET, EXTENDED RELEASE ORAL at 20:32

## 2024-11-26 RX ADMIN — FAMOTIDINE 20 MG: 10 INJECTION INTRAVENOUS at 08:20

## 2024-11-26 RX ADMIN — APIXABAN 2.5 MG: 5 TABLET, FILM COATED ORAL at 20:36

## 2024-11-26 RX ADMIN — APIXABAN 2.5 MG: 5 TABLET, FILM COATED ORAL at 11:28

## 2024-11-26 RX ADMIN — POTASSIUM BICARBONATE 50 MEQ: 978 TABLET, EFFERVESCENT ORAL at 09:32

## 2024-11-26 RX ADMIN — INFLUENZA A VIRUS A/VICTORIA/4897/2022 IVR-238 (H1N1) ANTIGEN (FORMALDEHYDE INACTIVATED), INFLUENZA A VIRUS A/DARWIN/6/2021 IVR-227 (H3N2) ANTIGEN (FORMALDEHYDE INACTIVATED), INFLUENZA B VIRUS B/AUSTRIA/1359417/2021 BVR-26 ANTIGEN (FORMALDEHYDE INACTIVATED), INFLUENZA B VIRUS B/PHUKET/3073/2013 BVR-1B ANTIGEN (FORMALDEHYDE INACTIVATED) 60 MCG: 15; 15; 15; 15 INJECTION, SUSPENSION INTRAMUSCULAR at 14:39

## 2024-11-26 RX ADMIN — ATORVASTATIN CALCIUM 40 MG: 40 TABLET, FILM COATED ORAL at 20:32

## 2024-11-26 RX ADMIN — SODIUM CHLORIDE 100 ML: 900 INJECTION, SOLUTION INTRAVENOUS at 21:40

## 2024-11-26 RX ADMIN — Medication 400 MG: at 20:32

## 2024-11-27 VITALS — HEART RATE: 107 BPM

## 2024-11-27 VITALS — HEART RATE: 96 BPM

## 2024-11-27 VITALS — SYSTOLIC BLOOD PRESSURE: 152 MMHG | HEART RATE: 90 BPM | DIASTOLIC BLOOD PRESSURE: 93 MMHG

## 2024-11-27 VITALS — DIASTOLIC BLOOD PRESSURE: 93 MMHG | SYSTOLIC BLOOD PRESSURE: 152 MMHG

## 2024-11-27 VITALS
HEART RATE: 80 BPM | DIASTOLIC BLOOD PRESSURE: 107 MMHG | TEMPERATURE: 97.7 F | SYSTOLIC BLOOD PRESSURE: 172 MMHG | OXYGEN SATURATION: 96 %

## 2024-11-27 VITALS — HEART RATE: 87 BPM

## 2024-11-27 VITALS — SYSTOLIC BLOOD PRESSURE: 162 MMHG | DIASTOLIC BLOOD PRESSURE: 98 MMHG

## 2024-11-27 VITALS — HEART RATE: 83 BPM

## 2024-11-27 VITALS — HEART RATE: 110 BPM

## 2024-11-27 VITALS — OXYGEN SATURATION: 97 %

## 2024-11-27 VITALS — HEART RATE: 108 BPM

## 2024-11-27 LAB
ADD MANUAL DIFF: NO
ALANINE AMINOTRANSFERASE: 24 U/L (ref 16–63)
ALBUMIN GLOBULIN RATIO: 1.3
ALBUMIN LEVEL: 3.4 G/DL (ref 3.4–5)
ALKALINE PHOSPHATASE: 85 U/L (ref 46–116)
ANION GAP: 14.6
ASPARTATE AMINO TRANSFERASE: 17 U/L (ref 15–37)
BLOOD UREA NITROGEN: 10 MG/DL (ref 7–18)
CALCIUM: 8.4 MG/DL (ref 8.5–10.1)
CARBON DIOXIDE: 24.1 MMOL/L (ref 21–32)
CHLORIDE: 103 MMOL/L (ref 98–107)
CO2 BLD-SCNC: 24.1 MMOL/L (ref 21–32)
ESTIMATED GFR (AFRICAN AMERICA: 59
ESTIMATED GFR (NON-AFRICAN AME: 49
GLOBULIN: 2.6 G/DL
GLUCOSE BLD-MCNC: 102 MG/DL (ref 74–106)
HCT VFR BLD CALC: 40.5 % (ref 42–54)
HEMATOCRIT: 40.5 % (ref 42–54)
HEMOGLOBIN: 13.3 G/DL (ref 14–18)
IMMATURE GRANULOCYTES ABS AUTO: 0.02 10^3/UL (ref 0–0.03)
IMMATURE GRANULOCYTES PCT AUTO: 0.4 % (ref 0–0.5)
LYMPHOCYTES  ABSOLUTE AUTO: 1.1 10^3/UL (ref 1.2–3.8)
MCV RBC: 87.5 FL (ref 80–94)
MEAN CORPUSCULAR HEMOGLOBIN: 28.7 PG (ref 25.9–34)
MEAN CORPUSCULAR HGB CONC: 32.8 G/DL (ref 29.9–35.2)
MEAN CORPUSCULAR VOLUME: 87.5 FL (ref 80–94)
PLATELET # BLD: 201 10^3/UL (ref 150–450)
PLATELET COUNT: 201 10^3/UL (ref 150–450)
POTASSIUM SERPLBLD-SCNC: 3.7 MMOL/L (ref 3.5–5.1)
POTASSIUM: 3.7 MMOL/L (ref 3.5–5.1)
RED BLOOD COUNT: 4.63 10^6/UL (ref 4.7–6.1)
SODIUM BLD-SCNC: 138 MMOL/L (ref 136–145)
SODIUM: 138 MMOL/L (ref 136–145)
TOTAL PROTEIN: 6 G/DL (ref 6.4–8.2)
WBC # BLD: 5.4 10^3/UL (ref 4–11)
WHITE BLOOD COUNT: 5.4 10^3/UL (ref 4–11)

## 2024-11-27 RX ADMIN — POTASSIUM CHLORIDE 20 MEQ: 750 TABLET, EXTENDED RELEASE ORAL at 08:41

## 2024-11-27 RX ADMIN — OMEPRAZOLE 40 MG: 40 CAPSULE, DELAYED RELEASE ORAL at 05:37

## 2024-11-27 RX ADMIN — ALPRAZOLAM 0.5 MG: 0.5 TABLET ORAL at 08:41

## 2024-11-27 RX ADMIN — MEMANTINE HYDROCHLORIDE 14 MG: 7 CAPSULE, EXTENDED RELEASE ORAL at 08:41

## 2024-11-27 RX ADMIN — LOSARTAN POTASSIUM 25 MG: 25 TABLET, FILM COATED ORAL at 08:41

## 2024-11-27 RX ADMIN — APIXABAN 2.5 MG: 5 TABLET, FILM COATED ORAL at 09:39

## 2024-11-27 RX ADMIN — Medication 400 MG: at 08:41

## 2024-11-27 RX ADMIN — DILTIAZEM HYDROCHLORIDE 180 MG: 180 CAPSULE, COATED, EXTENDED RELEASE ORAL at 08:45

## 2025-01-21 ENCOUNTER — HOSPITAL ENCOUNTER
Dept: HOSPITAL 101 - LAB | Age: 85
Discharge: HOME | End: 2025-01-21
Payer: MEDICARE

## 2025-01-21 DIAGNOSIS — Z95.1: Primary | ICD-10-CM

## 2025-01-21 LAB
ANION GAP: 11.9
BLOOD UREA NITROGEN: 15 MG/DL (ref 7–18)
CALCIUM: 8.6 MG/DL (ref 8.5–10.1)
CARBON DIOXIDE: 29.7 MMOL/L (ref 21–32)
CHLORIDE: 99 MMOL/L (ref 98–107)
CO2 BLD-SCNC: 29.7 MMOL/L (ref 21–32)
ESTIMATED GFR (AFRICAN AMERICA: 52
ESTIMATED GFR (NON-AFRICAN AME: 43
GLUCOSE BLD-MCNC: 113 MG/DL (ref 74–106)
POTASSIUM SERPLBLD-SCNC: 3.6 MMOL/L (ref 3.5–5.1)
POTASSIUM: 3.6 MMOL/L (ref 3.5–5.1)
SODIUM BLD-SCNC: 137 MMOL/L (ref 136–145)
SODIUM: 137 MMOL/L (ref 136–145)

## 2025-01-21 PROCEDURE — 80048 BASIC METABOLIC PNL TOTAL CA: CPT

## 2025-01-21 PROCEDURE — 36415 COLL VENOUS BLD VENIPUNCTURE: CPT

## 2025-03-04 ENCOUNTER — HOSPITAL ENCOUNTER
Dept: HOSPITAL 101 - LAB | Age: 85
Discharge: HOME | End: 2025-03-04
Payer: MEDICARE

## 2025-03-04 DIAGNOSIS — I48.11: Primary | ICD-10-CM

## 2025-03-04 LAB
ADD MANUAL DIFF: NO
ANION GAP: 9.8
BLOOD UREA NITROGEN: 18 MG/DL (ref 7–18)
CALCIUM: 8.6 MG/DL (ref 8.5–10.1)
CARBON DIOXIDE: 28.2 MMOL/L (ref 21–32)
CHLORIDE: 102 MMOL/L (ref 98–107)
CO2 BLD-SCNC: 28.2 MMOL/L (ref 21–32)
ESTIMATED GFR (AFRICAN AMERICA: 48
ESTIMATED GFR (NON-AFRICAN AME: 40
GLUCOSE BLD-MCNC: 104 MG/DL (ref 74–106)
HCT VFR BLD CALC: 40.2 % (ref 42–54)
HEMATOCRIT: 40.2 % (ref 42–54)
HEMOGLOBIN: 13 G/DL (ref 14–18)
IMMATURE GRANULOCYTES ABS AUTO: 0.01 10^3/UL (ref 0–0.03)
IMMATURE GRANULOCYTES PCT AUTO: 0.2 % (ref 0–0.5)
LYMPHOCYTES  ABSOLUTE AUTO: 1.2 10^3/UL (ref 1.2–3.8)
MCV RBC: 85 FL (ref 80–94)
MEAN CORPUSCULAR HEMOGLOBIN: 27.5 PG (ref 25.9–34)
MEAN CORPUSCULAR HGB CONC: 32.3 G/DL (ref 29.9–35.2)
MEAN CORPUSCULAR VOLUME: 85 FL (ref 80–94)
PLATELET # BLD: 179 10^3/UL (ref 150–450)
PLATELET COUNT: 179 10^3/UL (ref 150–450)
POTASSIUM SERPLBLD-SCNC: 4 MMOL/L (ref 3.5–5.1)
POTASSIUM: 4 MMOL/L (ref 3.5–5.1)
RED BLOOD COUNT: 4.73 10^6/UL (ref 4.7–6.1)
SODIUM BLD-SCNC: 136 MMOL/L (ref 136–145)
SODIUM: 136 MMOL/L (ref 136–145)
WBC # BLD: 5.3 10^3/UL (ref 4–11)
WHITE BLOOD COUNT: 5.3 10^3/UL (ref 4–11)

## 2025-03-04 PROCEDURE — 36415 COLL VENOUS BLD VENIPUNCTURE: CPT

## 2025-03-04 PROCEDURE — 80048 BASIC METABOLIC PNL TOTAL CA: CPT

## 2025-03-04 PROCEDURE — 85025 COMPLETE CBC W/AUTO DIFF WBC: CPT

## 2025-05-25 ENCOUNTER — HOSPITAL ENCOUNTER (INPATIENT)
Dept: HOSPITAL 101 - ER | Age: 85
LOS: 1 days | Discharge: HOME | DRG: 291 | End: 2025-05-26
Payer: MEDICARE

## 2025-05-25 VITALS — OXYGEN SATURATION: 91 % | HEART RATE: 80 BPM

## 2025-05-25 VITALS — HEART RATE: 82 BPM

## 2025-05-25 VITALS — DIASTOLIC BLOOD PRESSURE: 92 MMHG | OXYGEN SATURATION: 92 % | HEART RATE: 80 BPM | SYSTOLIC BLOOD PRESSURE: 144 MMHG

## 2025-05-25 VITALS — HEART RATE: 83 BPM | DIASTOLIC BLOOD PRESSURE: 99 MMHG | OXYGEN SATURATION: 95 % | SYSTOLIC BLOOD PRESSURE: 179 MMHG

## 2025-05-25 VITALS — DIASTOLIC BLOOD PRESSURE: 100 MMHG | OXYGEN SATURATION: 93 % | HEART RATE: 82 BPM | SYSTOLIC BLOOD PRESSURE: 183 MMHG

## 2025-05-25 VITALS
TEMPERATURE: 97.34 F | DIASTOLIC BLOOD PRESSURE: 77 MMHG | HEART RATE: 82 BPM | OXYGEN SATURATION: 94 % | SYSTOLIC BLOOD PRESSURE: 148 MMHG

## 2025-05-25 VITALS — OXYGEN SATURATION: 93 % | HEART RATE: 83 BPM

## 2025-05-25 VITALS
TEMPERATURE: 97.88 F | BODY MASS INDEX: 28 KG/M2 | OXYGEN SATURATION: 93 % | DIASTOLIC BLOOD PRESSURE: 99 MMHG | HEART RATE: 81 BPM | SYSTOLIC BLOOD PRESSURE: 179 MMHG

## 2025-05-25 VITALS — HEART RATE: 80 BPM

## 2025-05-25 VITALS
SYSTOLIC BLOOD PRESSURE: 125 MMHG | DIASTOLIC BLOOD PRESSURE: 77 MMHG | TEMPERATURE: 98.1 F | OXYGEN SATURATION: 93 % | HEART RATE: 82 BPM

## 2025-05-25 VITALS
SYSTOLIC BLOOD PRESSURE: 137 MMHG | HEART RATE: 81 BPM | BODY MASS INDEX: 28.5 KG/M2 | DIASTOLIC BLOOD PRESSURE: 85 MMHG | OXYGEN SATURATION: 94 % | TEMPERATURE: 97.52 F

## 2025-05-25 VITALS — SYSTOLIC BLOOD PRESSURE: 141 MMHG | DIASTOLIC BLOOD PRESSURE: 101 MMHG

## 2025-05-25 VITALS — HEART RATE: 82 BPM | OXYGEN SATURATION: 96 %

## 2025-05-25 VITALS — HEART RATE: 80 BPM | OXYGEN SATURATION: 92 %

## 2025-05-25 VITALS — OXYGEN SATURATION: 90 % | HEART RATE: 80 BPM

## 2025-05-25 VITALS — OXYGEN SATURATION: 93 %

## 2025-05-25 VITALS — HEART RATE: 81 BPM

## 2025-05-25 VITALS — OXYGEN SATURATION: 94 % | HEART RATE: 82 BPM

## 2025-05-25 VITALS — OXYGEN SATURATION: 94 %

## 2025-05-25 DIAGNOSIS — F41.1: ICD-10-CM

## 2025-05-25 DIAGNOSIS — Z95.0: ICD-10-CM

## 2025-05-25 DIAGNOSIS — E83.42: ICD-10-CM

## 2025-05-25 DIAGNOSIS — I50.41: ICD-10-CM

## 2025-05-25 DIAGNOSIS — Z87.891: ICD-10-CM

## 2025-05-25 DIAGNOSIS — Z95.5: ICD-10-CM

## 2025-05-25 DIAGNOSIS — K57.90: ICD-10-CM

## 2025-05-25 DIAGNOSIS — K80.20: ICD-10-CM

## 2025-05-25 DIAGNOSIS — D50.0: ICD-10-CM

## 2025-05-25 DIAGNOSIS — N18.30: ICD-10-CM

## 2025-05-25 DIAGNOSIS — R07.9: ICD-10-CM

## 2025-05-25 DIAGNOSIS — E87.1: ICD-10-CM

## 2025-05-25 DIAGNOSIS — Z79.899: ICD-10-CM

## 2025-05-25 DIAGNOSIS — I13.0: Primary | ICD-10-CM

## 2025-05-25 DIAGNOSIS — N40.0: ICD-10-CM

## 2025-05-25 DIAGNOSIS — R06.03: ICD-10-CM

## 2025-05-25 DIAGNOSIS — F03.90: ICD-10-CM

## 2025-05-25 DIAGNOSIS — E78.00: ICD-10-CM

## 2025-05-25 DIAGNOSIS — Z87.19: ICD-10-CM

## 2025-05-25 DIAGNOSIS — I16.0: ICD-10-CM

## 2025-05-25 DIAGNOSIS — Z95.1: ICD-10-CM

## 2025-05-25 DIAGNOSIS — I48.19: ICD-10-CM

## 2025-05-25 DIAGNOSIS — I25.10: ICD-10-CM

## 2025-05-25 LAB
ADD MANUAL DIFF: NO
ADD MANUAL DIFF: NO
ALANINE AMINOTRANSFERASE: 28 U/L (ref 16–63)
ALBUMIN GLOBULIN RATIO: 1.3
ALBUMIN LEVEL: 3.9 G/DL (ref 3.4–5)
ALKALINE PHOSPHATASE: 100 U/L (ref 46–116)
ANION GAP: 13.9
ASPARTATE AMINO TRANSFERASE: 23 U/L (ref 15–37)
CALCIUM: 8.7 MG/DL (ref 8.5–10.1)
CHLORIDE: 98 MMOL/L (ref 98–107)
CO2 BLD-SCNC: 25.1 MMOL/L (ref 21–32)
ESTIMATED GFR (AFRICAN AMERICA: 58
ESTIMATED GFR (NON-AFRICAN AME: 48
GLOBULIN: 2.9 G/DL
GLUCOSE SERPLBLD-MCNC: 115 MG/DL (ref 74–106)
HCT VFR BLD CALC: 36.3 % (ref 42–54)
HCT VFR BLD CALC: 39.7 % (ref 42–54)
HEMOGLOBIN: 11.7 G/DL (ref 14–18)
HEMOGLOBIN: 12.6 G/DL (ref 14–18)
IMMATURE GRANULOCYTES ABS AUTO: 0.01 10^3/UL (ref 0–0.03)
IMMATURE GRANULOCYTES ABS AUTO: 0.03 10^3/UL (ref 0–0.03)
IMMATURE GRANULOCYTES PCT AUTO: 0.2 % (ref 0–0.5)
IMMATURE GRANULOCYTES PCT AUTO: 0.6 % (ref 0–0.5)
LYMPHOCYTES  ABSOLUTE AUTO: 0.8 10^3/UL (ref 1.2–3.8)
LYMPHOCYTES  ABSOLUTE AUTO: 1.2 10^3/UL (ref 1.2–3.8)
MAGNESIUM: 1.7 MG/DL (ref 1.8–2.4)
MCH RBC QN AUTO: 25.7 PG (ref 25.9–34)
MCV RBC: 80.7 FL (ref 80–94)
MEAN CORPUSCULAR HGB CONC: 31.7 G/DL (ref 29.9–35.2)
MEAN CORPUSCULAR HGB CONC: 32.2 G/DL (ref 29.9–35.2)
PLATELET # BLD: 159 10^3/UL (ref 150–450)
PLATELET # BLD: 177 10^3/UL (ref 150–450)
SODIUM BLD-SCNC: 133 MMOL/L (ref 136–145)
THYROID STIMULATING HORMONE: 2.28 UIU/ML (ref 0.36–3.74)
TOTAL PROTEIN: 6.8 G/DL (ref 6.4–8.2)
WBC # BLD: 4.9 10^3/UL (ref 4–11)
WBC # BLD: 5.4 10^3/UL (ref 4–11)

## 2025-05-25 PROCEDURE — 96376 TX/PRO/DX INJ SAME DRUG ADON: CPT

## 2025-05-25 PROCEDURE — 83880 ASSAY OF NATRIURETIC PEPTIDE: CPT

## 2025-05-25 PROCEDURE — 80076 HEPATIC FUNCTION PANEL: CPT

## 2025-05-25 PROCEDURE — 96375 TX/PRO/DX INJ NEW DRUG ADDON: CPT

## 2025-05-25 PROCEDURE — 84443 ASSAY THYROID STIM HORMONE: CPT

## 2025-05-25 PROCEDURE — 85025 COMPLETE CBC W/AUTO DIFF WBC: CPT

## 2025-05-25 PROCEDURE — 94668 MNPJ CHEST WALL SBSQ: CPT

## 2025-05-25 PROCEDURE — 94667 MNPJ CHEST WALL 1ST: CPT

## 2025-05-25 PROCEDURE — G0328 FECAL BLOOD SCRN IMMUNOASSAY: HCPCS

## 2025-05-25 PROCEDURE — 93005 ELECTROCARDIOGRAM TRACING: CPT

## 2025-05-25 PROCEDURE — 81001 URINALYSIS AUTO W/SCOPE: CPT

## 2025-05-25 PROCEDURE — 84484 ASSAY OF TROPONIN QUANT: CPT

## 2025-05-25 PROCEDURE — 94761 N-INVAS EAR/PLS OXIMETRY MLT: CPT

## 2025-05-25 PROCEDURE — 84436 ASSAY OF TOTAL THYROXINE: CPT

## 2025-05-25 PROCEDURE — 80048 BASIC METABOLIC PNL TOTAL CA: CPT

## 2025-05-25 PROCEDURE — 36415 COLL VENOUS BLD VENIPUNCTURE: CPT

## 2025-05-25 PROCEDURE — 99285 EMERGENCY DEPT VISIT HI MDM: CPT

## 2025-05-25 PROCEDURE — 96374 THER/PROPH/DIAG INJ IV PUSH: CPT

## 2025-05-25 PROCEDURE — 83735 ASSAY OF MAGNESIUM: CPT

## 2025-05-25 PROCEDURE — 74176 CT ABD & PELVIS W/O CONTRAST: CPT

## 2025-05-25 PROCEDURE — 71045 X-RAY EXAM CHEST 1 VIEW: CPT

## 2025-05-25 RX ADMIN — FUROSEMIDE 40 MG: 10 INJECTION, SOLUTION INTRAMUSCULAR; INTRAVENOUS at 06:53

## 2025-05-25 RX ADMIN — TAMSULOSIN HYDROCHLORIDE 0.4 MG: 0.4 CAPSULE ORAL at 21:51

## 2025-05-25 RX ADMIN — METOPROLOL TARTRATE 25 MG: 25 TABLET, FILM COATED ORAL at 21:50

## 2025-05-25 RX ADMIN — POTASSIUM CHLORIDE 20 MEQ: 750 TABLET, EXTENDED RELEASE ORAL at 21:50

## 2025-05-25 RX ADMIN — Medication 400 MG: at 21:51

## 2025-05-25 RX ADMIN — LABETALOL HYDROCHLORIDE 20 MG: 5 INJECTION, SOLUTION INTRAVENOUS at 04:30

## 2025-05-25 RX ADMIN — ALPRAZOLAM 0.5 MG: 0.5 TABLET ORAL at 21:51

## 2025-05-25 RX ADMIN — Medication 400 MG: at 13:14

## 2025-05-25 RX ADMIN — ISOSORBIDE MONONITRATE 30 MG: 30 TABLET, EXTENDED RELEASE ORAL at 13:13

## 2025-05-25 RX ADMIN — ATORVASTATIN CALCIUM 40 MG: 40 TABLET, FILM COATED ORAL at 21:50

## 2025-05-25 RX ADMIN — LOSARTAN POTASSIUM 50 MG: 25 TABLET, FILM COATED ORAL at 21:51

## 2025-05-25 RX ADMIN — BUMETANIDE 20 MG: 0.25 INJECTION INTRAMUSCULAR; INTRAVENOUS at 11:14

## 2025-05-25 RX ADMIN — MEMANTINE HYDROCHLORIDE 14 MG: 7 CAPSULE, EXTENDED RELEASE ORAL at 13:14

## 2025-05-26 VITALS
HEART RATE: 85 BPM | TEMPERATURE: 97.3 F | OXYGEN SATURATION: 93 % | DIASTOLIC BLOOD PRESSURE: 73 MMHG | SYSTOLIC BLOOD PRESSURE: 113 MMHG

## 2025-05-26 VITALS — HEART RATE: 84 BPM

## 2025-05-26 VITALS — HEART RATE: 81 BPM

## 2025-05-26 VITALS
HEART RATE: 81 BPM | OXYGEN SATURATION: 90 % | SYSTOLIC BLOOD PRESSURE: 123 MMHG | DIASTOLIC BLOOD PRESSURE: 76 MMHG | TEMPERATURE: 97.7 F

## 2025-05-26 VITALS
DIASTOLIC BLOOD PRESSURE: 71 MMHG | SYSTOLIC BLOOD PRESSURE: 110 MMHG | TEMPERATURE: 97.8 F | HEART RATE: 81 BPM | OXYGEN SATURATION: 95 %

## 2025-05-26 VITALS
OXYGEN SATURATION: 96 % | HEART RATE: 82 BPM | SYSTOLIC BLOOD PRESSURE: 129 MMHG | DIASTOLIC BLOOD PRESSURE: 78 MMHG | TEMPERATURE: 98.4 F

## 2025-05-26 LAB
ADD MANUAL DIFF: NO
ANION GAP: 13.4
CALCIUM: 8.3 MG/DL (ref 8.5–10.1)
CHLORIDE: 100 MMOL/L (ref 98–107)
CO2 BLD-SCNC: 27.9 MMOL/L (ref 21–32)
ESTIMATED GFR (AFRICAN AMERICA: 46
ESTIMATED GFR (NON-AFRICAN AME: 38
GLUCOSE SERPLBLD-MCNC: 96 MG/DL (ref 74–106)
HEMOGLOBIN: 11.1 G/DL (ref 14–18)
IMMATURE GRANULOCYTES ABS AUTO: 0.01 10^3/UL (ref 0–0.03)
IMMATURE GRANULOCYTES PCT AUTO: 0.2 % (ref 0–0.5)
LYMPHOCYTES  ABSOLUTE AUTO: 0.9 10^3/UL (ref 1.2–3.8)
MAGNESIUM: 1.6 MG/DL (ref 1.8–2.4)
MCH RBC QN AUTO: 26.3 PG (ref 25.9–34)
MCV RBC: 80.6 FL (ref 80–94)
MEAN CORPUSCULAR HGB CONC: 32.6 G/DL (ref 29.9–35.2)
PLATELET # BLD: 163 10^3/UL (ref 150–450)
POTASSIUM SERPLBLD-SCNC: 3.3 MMOL/L (ref 3.5–5.1)
RBC # BLD AUTO: 4.22 10^6/UL (ref 4.7–6.1)
SODIUM BLD-SCNC: 138 MMOL/L (ref 136–145)
WBC # BLD: 5.5 10^3/UL (ref 4–11)

## 2025-05-26 RX ADMIN — LOSARTAN POTASSIUM 50 MG: 25 TABLET, FILM COATED ORAL at 09:24

## 2025-05-26 RX ADMIN — Medication 400 MG: at 12:22

## 2025-05-26 RX ADMIN — Medication 400 MG: at 05:16

## 2025-05-26 RX ADMIN — DILTIAZEM HYDROCHLORIDE 180 MG: 180 CAPSULE, COATED, EXTENDED RELEASE ORAL at 09:24

## 2025-05-26 RX ADMIN — ISOSORBIDE MONONITRATE 30 MG: 30 TABLET, EXTENDED RELEASE ORAL at 09:24

## 2025-05-26 RX ADMIN — METOPROLOL TARTRATE 25 MG: 25 TABLET, FILM COATED ORAL at 09:24

## 2025-05-26 RX ADMIN — ALPRAZOLAM 0.5 MG: 0.5 TABLET ORAL at 09:24

## 2025-05-26 RX ADMIN — POTASSIUM CHLORIDE 20 MEQ: 750 TABLET, EXTENDED RELEASE ORAL at 09:24

## 2025-05-26 RX ADMIN — MEMANTINE HYDROCHLORIDE 14 MG: 7 CAPSULE, EXTENDED RELEASE ORAL at 09:24

## 2025-05-26 RX ADMIN — APIXABAN 2.5 MG: 5 TABLET, FILM COATED ORAL at 09:24

## 2025-06-12 ENCOUNTER — HOSPITAL ENCOUNTER (EMERGENCY)
Age: 85
LOS: 1 days | Discharge: HOME | End: 2025-06-13
Payer: MEDICARE

## 2025-06-12 VITALS
OXYGEN SATURATION: 98 % | TEMPERATURE: 98.42 F | SYSTOLIC BLOOD PRESSURE: 170 MMHG | HEART RATE: 80 BPM | DIASTOLIC BLOOD PRESSURE: 92 MMHG

## 2025-06-12 VITALS — HEART RATE: 80 BPM | OXYGEN SATURATION: 95 % | SYSTOLIC BLOOD PRESSURE: 142 MMHG | DIASTOLIC BLOOD PRESSURE: 83 MMHG

## 2025-06-12 VITALS — BODY MASS INDEX: 27.2 KG/M2

## 2025-06-12 DIAGNOSIS — Z95.0: ICD-10-CM

## 2025-06-12 DIAGNOSIS — I50.9: Primary | ICD-10-CM

## 2025-06-12 DIAGNOSIS — I48.19: ICD-10-CM

## 2025-06-12 DIAGNOSIS — I25.10: ICD-10-CM

## 2025-06-12 DIAGNOSIS — R11.0: ICD-10-CM

## 2025-06-12 DIAGNOSIS — R06.02: ICD-10-CM

## 2025-06-12 DIAGNOSIS — D50.0: ICD-10-CM

## 2025-06-12 DIAGNOSIS — I51.7: ICD-10-CM

## 2025-06-12 LAB
ADD MANUAL DIFF: NO
ALANINE AMINOTRANSFERASE: 21 U/L (ref 16–63)
ALBUMIN GLOBULIN RATIO: 1.3
ALBUMIN LEVEL: 3.8 G/DL (ref 3.4–5)
ALKALINE PHOSPHATASE: 106 U/L (ref 46–116)
ANION GAP: 15.5
ASPARTATE AMINO TRANSFERASE: 27 U/L (ref 15–37)
BASOPHILS # BLD AUTO: 0 10^3/UL (ref 0–0.1)
BASOPHILS NFR BLD AUTO: 0.2 % (ref 0.2–2)
BILIRUBIN TOTAL: 1 MG/DL (ref 0.2–1)
BUN SERPL-MCNC: 11 MG/DL (ref 7–18)
BUN/CREAT SERPL: 7.6
CALCIUM: 9.1 MG/DL (ref 8.5–10.1)
CHLORIDE: 95 MMOL/L (ref 98–107)
CO2 BLD-SCNC: 24.2 MMOL/L (ref 21–32)
CREAT SERPL-SCNC: 1.44 MG/DL (ref 0.7–1.3)
EOSINOPHIL # BLD AUTO: 0.1 10^3/UL (ref 0–0.7)
EOSINOPHIL NFR BLD AUTO: 1.6 % (ref 0.9–7)
ERYTHROCYTE [DISTWIDTH] IN BLOOD BY AUTOMATED COUNT: 19.6 % (ref 11–15)
ESTIMATED GFR (AFRICAN AMERICA: 57
ESTIMATED GFR (NON-AFRICAN AME: 47
GLOBULIN: 2.9 G/DL
GLUCOSE SERPLBLD-MCNC: 142 MG/DL (ref 74–106)
HCT VFR BLD CALC: 40.6 % (ref 42–54)
HEMOGLOBIN: 13.2 G/DL (ref 14–18)
IMMATURE GRANULOCYTES ABS AUTO: 0.01 10^3/UL (ref 0–0.03)
IMMATURE GRANULOCYTES PCT AUTO: 0.2 % (ref 0–0.5)
LYMPHOCYTES  ABSOLUTE AUTO: 1 10^3/UL (ref 1.2–3.8)
LYMPHOCYTES PERCENT AUTO: 21 % (ref 20.5–60)
MCH RBC QN AUTO: 26.7 PG (ref 25.9–34)
MCV RBC: 82 FL (ref 80–94)
MEAN CORPUSCULAR HGB CONC: 32.5 G/DL (ref 29.9–35.2)
MEAN PLATELET VOLUME: 10.9 FL (ref 9.5–13.5)
MONOCYTES # BLD AUTO: 0.6 10^3/UL (ref 0.3–0.8)
MONOCYTES NFR BLD AUTO: 12.8 % (ref 1.7–12)
NEUTROPHILS # BLD AUTO: 3.1 10^3/UL (ref 1.4–6.5)
NEUTROPHILS NFR BLD AUTO: 64.2 % (ref 43–75)
NT PRO B TYPE NATRIURETIC PEPT: 4783 PG/ML (ref ?–1800)
PLATELET # BLD: 161 10^3/UL (ref 150–450)
POTASSIUM SERPLBLD-SCNC: 4.7 MMOL/L (ref 3.5–5.1)
RBC # BLD AUTO: 4.95 10^6/UL (ref 4.7–6.1)
SODIUM BLD-SCNC: 130 MMOL/L (ref 136–145)
TOTAL PROTEIN: 6.7 G/DL (ref 6.4–8.2)
TROPONIN I HIGH SENSITIVITY: 28.1 PG/ML (ref 4–76.1)
WBC # BLD: 4.9 10^3/UL (ref 4–11)

## 2025-06-12 PROCEDURE — 93005 ELECTROCARDIOGRAM TRACING: CPT

## 2025-06-12 PROCEDURE — 96375 TX/PRO/DX INJ NEW DRUG ADDON: CPT

## 2025-06-12 PROCEDURE — 85025 COMPLETE CBC W/AUTO DIFF WBC: CPT

## 2025-06-12 PROCEDURE — 71045 X-RAY EXAM CHEST 1 VIEW: CPT

## 2025-06-12 PROCEDURE — 80053 COMPREHEN METABOLIC PANEL: CPT

## 2025-06-12 PROCEDURE — 36415 COLL VENOUS BLD VENIPUNCTURE: CPT

## 2025-06-12 PROCEDURE — 84484 ASSAY OF TROPONIN QUANT: CPT

## 2025-06-12 PROCEDURE — 83880 ASSAY OF NATRIURETIC PEPTIDE: CPT

## 2025-06-12 PROCEDURE — 99285 EMERGENCY DEPT VISIT HI MDM: CPT

## 2025-06-12 PROCEDURE — 96374 THER/PROPH/DIAG INJ IV PUSH: CPT

## 2025-07-31 ENCOUNTER — HOSPITAL ENCOUNTER
Dept: HOSPITAL 101 - CARD | Age: 85
Discharge: HOME | End: 2025-07-31
Payer: MEDICARE

## 2025-07-31 DIAGNOSIS — I50.33: Primary | ICD-10-CM

## 2025-07-31 PROCEDURE — 93356 MYOCRD STRAIN IMG SPCKL TRCK: CPT

## 2025-07-31 PROCEDURE — 93306 TTE W/DOPPLER COMPLETE: CPT
